# Patient Record
Sex: FEMALE | Race: WHITE | NOT HISPANIC OR LATINO | URBAN - METROPOLITAN AREA
[De-identification: names, ages, dates, MRNs, and addresses within clinical notes are randomized per-mention and may not be internally consistent; named-entity substitution may affect disease eponyms.]

---

## 2021-03-25 ENCOUNTER — HOSPITAL ENCOUNTER (OUTPATIENT)
Dept: GENERAL RADIOLOGY | Facility: HOSPITAL | Age: 26
Discharge: HOME | End: 2021-03-25
Admitting: INTERNAL MEDICINE

## 2021-03-25 ENCOUNTER — TRANSCRIBE ORDERS (OUTPATIENT)
Dept: ADMINISTRATIVE | Facility: HOSPITAL | Age: 26
End: 2021-03-25

## 2021-03-25 DIAGNOSIS — M54.41 ACUTE RIGHT-SIDED BACK PAIN WITH SCIATICA: Primary | ICD-10-CM

## 2021-03-25 PROCEDURE — 72114 X-RAY EXAM L-S SPINE BENDING: CPT

## 2022-02-11 ENCOUNTER — LAB (OUTPATIENT)
Dept: LAB | Facility: HOSPITAL | Age: 27
End: 2022-02-11

## 2022-02-11 ENCOUNTER — TRANSCRIBE ORDERS (OUTPATIENT)
Dept: LAB | Facility: HOSPITAL | Age: 27
End: 2022-02-11

## 2022-02-11 DIAGNOSIS — L57.8 NODULAR ELASTOSIS WITH CYSTS AND COMEDONES OF FAVRE AND RACOUCHOT: ICD-10-CM

## 2022-02-11 DIAGNOSIS — O92.6 PERSISTENT POSTPARTUM AMENORRHEA-GALACTORRHEA SYNDROME: ICD-10-CM

## 2022-02-11 DIAGNOSIS — L70.0 NODULAR ELASTOSIS WITH CYSTS AND COMEDONES OF FAVRE AND RACOUCHOT: Primary | ICD-10-CM

## 2022-02-11 DIAGNOSIS — N91.1 PERSISTENT POSTPARTUM AMENORRHEA-GALACTORRHEA SYNDROME: ICD-10-CM

## 2022-02-11 DIAGNOSIS — L57.8 NODULAR ELASTOSIS WITH CYSTS AND COMEDONES OF FAVRE AND RACOUCHOT: Primary | ICD-10-CM

## 2022-02-11 DIAGNOSIS — L70.0 NODULAR ELASTOSIS WITH CYSTS AND COMEDONES OF FAVRE AND RACOUCHOT: ICD-10-CM

## 2022-02-11 LAB
DEPRECATED RDW RBC AUTO: 41.6 FL (ref 37–54)
ERYTHROCYTE [DISTWIDTH] IN BLOOD BY AUTOMATED COUNT: 13.1 % (ref 12.3–15.4)
HCT VFR BLD AUTO: 42.7 % (ref 34–46.6)
HGB BLD-MCNC: 14.4 G/DL (ref 12–15.9)
MCH RBC QN AUTO: 29.7 PG (ref 26.6–33)
MCHC RBC AUTO-ENTMCNC: 33.7 G/DL (ref 31.5–35.7)
MCV RBC AUTO: 88 FL (ref 79–97)
PLATELET # BLD AUTO: 253 10*3/MM3 (ref 140–450)
PMV BLD AUTO: 9.8 FL (ref 6–12)
RBC # BLD AUTO: 4.85 10*6/MM3 (ref 3.77–5.28)
WBC NRBC COR # BLD: 6.63 10*3/MM3 (ref 3.4–10.8)

## 2022-02-11 PROCEDURE — 84146 ASSAY OF PROLACTIN: CPT

## 2022-02-11 PROCEDURE — 84443 ASSAY THYROID STIM HORMONE: CPT

## 2022-02-11 PROCEDURE — 83498 ASY HYDROXYPROGESTERONE 17-D: CPT

## 2022-02-11 PROCEDURE — 85027 COMPLETE CBC AUTOMATED: CPT | Performed by: OBSTETRICS & GYNECOLOGY

## 2022-02-11 PROCEDURE — 84403 ASSAY OF TOTAL TESTOSTERONE: CPT

## 2022-02-11 PROCEDURE — 36415 COLL VENOUS BLD VENIPUNCTURE: CPT

## 2022-02-11 PROCEDURE — 82627 DEHYDROEPIANDROSTERONE: CPT

## 2022-02-12 LAB
DHEA-S SERPL-MCNC: 321 UG/DL (ref 84.8–378)
PROLACTIN SERPL-MCNC: 9.24 NG/ML (ref 4.79–23.3)
TESTOST SERPL-MCNC: 95.6 NG/DL (ref 8.4–48.1)
TSH SERPL DL<=0.05 MIU/L-ACNC: 2.15 UIU/ML (ref 0.27–4.2)

## 2022-02-18 LAB — 17OHP SERPL-MCNC: 100 NG/DL

## 2024-07-29 ENCOUNTER — OFFICE VISIT (OUTPATIENT)
Dept: ENDOCRINOLOGY | Facility: CLINIC | Age: 29
End: 2024-07-29
Payer: COMMERCIAL

## 2024-07-29 VITALS
SYSTOLIC BLOOD PRESSURE: 116 MMHG | HEIGHT: 66 IN | BODY MASS INDEX: 30.62 KG/M2 | WEIGHT: 190.5 LBS | DIASTOLIC BLOOD PRESSURE: 70 MMHG | OXYGEN SATURATION: 98 % | HEART RATE: 84 BPM

## 2024-07-29 DIAGNOSIS — R94.6 ABNORMAL THYROID FUNCTION TEST: ICD-10-CM

## 2024-07-29 DIAGNOSIS — R79.89 ABNORMAL CORTISOL LEVEL: Primary | ICD-10-CM

## 2024-07-29 DIAGNOSIS — E28.2 PCOS (POLYCYSTIC OVARIAN SYNDROME): ICD-10-CM

## 2024-07-29 PROCEDURE — 84439 ASSAY OF FREE THYROXINE: CPT | Performed by: PHYSICIAN ASSISTANT

## 2024-07-29 PROCEDURE — 84443 ASSAY THYROID STIM HORMONE: CPT | Performed by: PHYSICIAN ASSISTANT

## 2024-07-29 PROCEDURE — 99204 OFFICE O/P NEW MOD 45 MIN: CPT | Performed by: PHYSICIAN ASSISTANT

## 2024-07-29 RX ORDER — NORETHINDRONE ACETATE AND ETHINYL ESTRADIOL 1MG-20(21)
1 KIT ORAL DAILY
COMMUNITY

## 2024-07-29 RX ORDER — EPINEPHRINE 0.3 MG/.3ML
0.3 INJECTION SUBCUTANEOUS ONCE
COMMUNITY

## 2024-07-29 RX ORDER — MAGNESIUM CARB/ALUMINUM HYDROX 105-160MG
50 TABLET,CHEWABLE ORAL ONCE
COMMUNITY

## 2024-07-29 RX ORDER — METFORMIN HYDROCHLORIDE 500 MG/1
500 TABLET, EXTENDED RELEASE ORAL
Qty: 90 TABLET | Refills: 1 | Status: SHIPPED | OUTPATIENT
Start: 2024-07-29

## 2024-07-29 RX ORDER — FLUTICASONE PROPIONATE 50 MCG
1 SPRAY, SUSPENSION (ML) NASAL DAILY
COMMUNITY

## 2024-07-29 RX ORDER — TRAZODONE HYDROCHLORIDE 50 MG/1
50 TABLET ORAL NIGHTLY
COMMUNITY

## 2024-07-29 RX ORDER — MULTIPLE VITAMINS W/ MINERALS TAB 9MG-400MCG
1 TAB ORAL DAILY
COMMUNITY

## 2024-07-29 RX ORDER — CHOLECALCIFEROL (VITAMIN D3) 25 MCG
5000 TABLET ORAL DAILY
COMMUNITY

## 2024-07-29 RX ORDER — MULTIVIT WITH MINERALS/LUTEIN
1000 TABLET ORAL DAILY
COMMUNITY

## 2024-07-29 NOTE — ASSESSMENT & PLAN NOTE
Discussed with patient importance of blood sugar control for management of PCOS.  Discussed lifestyle tips: Reducing refined sugars, eating within 60 minutes of waking, caffeine after meal rather than before, increased resistance activity, limited duration of aerobic activity, sleep 8 to 9 hours a night, stress management, avoid tobacco/alcohol.   Discussed medications that may be helpful include metformin, spironolactone, OCP.   Patient interested in trying metformin-start 500 mg ER once daily.  Caution GI upset/diarrhea with use.

## 2024-07-29 NOTE — ASSESSMENT & PLAN NOTE
Gradual increase in TSH over time.  Discussed possible contributor to symptoms versus possible birth control as discussed above.  Advise recheck TFT for monitoring.  Consider additional TPO antibodies if subclinical.

## 2024-07-29 NOTE — ASSESSMENT & PLAN NOTE
Likely abnormal blood cortisol due to birth control use; Result from 24-hour urine cortisol normal on result note on patient portal; pending fax for confirmation, normal ACTH and prolactin. Estrogen progesterone expected to be suppressed with hormonal birth control use. Hx normal 17-OH progesterone and DHEA-S. Hx normal 17-OH progesterone and DHEA-S.   Symptoms consistent with hypercortisolism versus side effect of current hormonal birth control.   Ddx: thyroid disease, adrenal disease, pituitary disease  Advised consider stopping birth control or switching to combined OCP with lower antiadrenergic activity (ethynodiol, norgestimate, desogestrel, drospirsnone, dienogest) - patient will discuss with GYN.   Discussed with patient further evaluation with repeat 24 hr urine cortisol or dexamethasone suppression as needed, will need to be off birth control for at least 6 weeks for this to be accurate.   Defers testing at this time, will consider after discussing plan with GYN.

## 2024-07-29 NOTE — PROGRESS NOTES
Chief Complaint   Patient presents with    elevated morning coritsol      HPI  Aby Muniz is a 29 y.o. female presents today for evaluation of abnormal cortisol. Referring Provider: Mercedes Leonard MD.     Past medical history: PCOS (dx: 2010 with polycystic ovaries, hx of ovarian cyst rupture)    Current regimen includes: Junel 1/20, Zoloft 25 mg (taking for 1 year; hx celexa prior), trazodone 50 mg, Nurtec 75 mg (not taking as no relief, albuterol as needed    Expresses concerns for weight gain (estimates about 50 pounds in the last 1 to 2 years), fatigue, difficulty sleeping, alternating constipation/diarrhea, facial flushing and rash at nighttime, joint pain, headache that have been chronic/intermittent for the past 1 to 2 years.  Headaches worse around time of menses.  Denies loss of peripheral vision.    She reports history of being off birth control about 2 years ago for a brief time -she reports feeling better while not taking, but restarted due to concerns for irregular menses and acne, was noted to have elevated testosterone as well.  She has been on birth control since age 14, was previously taking Junel prior to restarting.  She was briefly on spironolactone for acne about 2 years ago and stopped about 1 year ago -prescribed by dermatologist in past.    LMP: currently day 2; cycles monthly; On birth control since 14; Started menses age 13.   Birth control: Junel FE 1/20  GYN: Dr Collins; [facility]     Labs from  5/28/2024 24-hour urine cortisol done; normal reported on patient portal  5/23/2024 ACTH 22  5/23/2024 (done at 11:02 AM) cortisol: 27.6, FSH: 5.0, LH: 7.0, estradiol less than 15, progesterone less than 0.5, testosterone: 25, TSH: 4.36, Prolactin: 13.6   3/14/2024 TSH: 2.86, FT4: 0.8, B12: 677, vitamin D: 54.8, A1c: 5.7%    Grandmother with hx thyroid cancer. Denies family history of MENs or other endocrine cancers.     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 No pertinent past medical history.  Past Surgical History:   Procedure Laterality Date    LAPAROSCOPIC OVARIAN CYSTECTOMY      TONSILECTOMY, ADENOIDECTOMY, BILATERAL MYRINGOTOMY AND TUBES        Family History   Problem Relation Age of Onset    Hypertension Mother     Melanoma Father     No Known Problems Sister       Social History     Socioeconomic History    Marital status: Unknown   Tobacco Use    Smoking status: Never    Smokeless tobacco: Never   Vaping Use    Vaping status: Never Used   Substance and Sexual Activity    Alcohol use: Never    Drug use: Never    Sexual activity: Defer      Allergies   Allergen Reactions    Dust Mite Extract Unknown - Low Severity    Molds & Smuts Unknown - Low Severity      Current Outpatient Medications on File Prior to Visit   Medication Sig Dispense Refill    Cholecalciferol 25 MCG (1000 UT) tablet Take 5 tablets by mouth Daily.      EPINEPHrine (EpiPen 2-Maximino) 0.3 MG/0.3ML solution auto-injector injection Inject 0.3 mL into the appropriate muscle as directed by prescriber 1 (One) Time.      fluticasone (FLONASE) 50 MCG/ACT nasal spray 1 spray into the nostril(s) as directed by provider Daily.      Inositol-D Chiro-Inositol (OVASITOL PO) Take 1 Scoop by mouth 2 (Two) Times a Day.      magnesium citrate 1.745 GM/30ML solution solution Take 50 mL by mouth 1 (One) Time. 50      multivitamin with minerals tablet tablet Take 1 tablet by mouth Daily.      norethindrone-ethinyl estradiol FE (JUNEL FE 1/20) 1-20 MG-MCG per tablet Take 1 tablet by  "mouth Daily.      sertraline (ZOLOFT) 50 MG tablet Take 1 tablet by mouth Daily.      traZODone (DESYREL) 50 MG tablet Take 1 tablet by mouth Every Night.      VITAMIN E 1000 UNIT capsule Take 1 capsule by mouth Daily.       No current facility-administered medications on file prior to visit.        Review of Systems   Constitutional:  Positive for diaphoresis, fatigue and unexpected weight gain. Negative for activity change and appetite change.   HENT:  Negative for trouble swallowing and voice change.    Eyes:  Negative for visual disturbance.   Respiratory:  Negative for shortness of breath.    Cardiovascular:  Negative for chest pain and palpitations.   Gastrointestinal:  Positive for constipation and diarrhea. Negative for abdominal pain.   Endocrine: Positive for polydipsia and polyuria. Negative for cold intolerance and heat intolerance.   Musculoskeletal:  Positive for arthralgias, back pain, myalgias, neck pain and neck stiffness.   Skin:  Positive for rash.   Neurological:  Positive for headache. Negative for dizziness.   Hematological:  Bruises/bleeds easily.   Psychiatric/Behavioral:  Positive for decreased concentration and sleep disturbance. The patient is nervous/anxious.         /70 (BP Location: Right arm, Patient Position: Sitting, Cuff Size: Adult)   Pulse 84   Ht 167.6 cm (66\")   Wt 86.4 kg (190 lb 8 oz)   SpO2 98%   BMI 30.75 kg/m²      Physical Exam  Constitutional:       Appearance: Normal appearance.   Neck:      Thyroid: No thyroid mass, thyromegaly or thyroid tenderness.   Cardiovascular:      Rate and Rhythm: Normal rate.   Pulmonary:      Effort: Pulmonary effort is normal.   Musculoskeletal:         General: Normal range of motion.   Skin:     General: Skin is warm and dry.   Neurological:      General: No focal deficit present.      Mental Status: She is alert and oriented to person, place, and time.   Psychiatric:         Mood and Affect: Mood normal.         Behavior: " "Behavior normal.         LABS AND IMAGING  CMP:  Lab Results   Component Value Date    BUN 11 07/05/2023    CREATININE 0.83 07/05/2023    EGFR 98.6 07/05/2023    BCR 13.3 07/05/2023     07/05/2023    K 4.4 07/05/2023    CO2 26.0 07/05/2023    CALCIUM 9.6 07/05/2023    ALBUMIN 4.2 07/05/2023    BILITOT 0.2 07/05/2023    ALKPHOS 61 07/05/2023    AST 20 07/05/2023    ALT 15 07/05/2023     Lipid Panel:  No results found for: \"CHOL\", \"TRIG\", \"HDL\", \"VLDL\", \"LDL\"  HbA1c:     Glucose:  No results found for: \"POCGLU\"  Microalbumin:  No results found for: \"MALBCRERATIO\"  TSH:  Lab Results   Component Value Date    TSH 1.930 07/05/2023       Assessment and Plan    Diagnoses and all orders for this visit:    1. Abnormal cortisol level (Primary)  Assessment & Plan:  Likely abnormal blood cortisol due to birth control use; Result from 24-hour urine cortisol normal on result note on patient portal; pending fax for confirmation, normal ACTH and prolactin. Estrogen progesterone expected to be suppressed with hormonal birth control use. Hx normal 17-OH progesterone and DHEA-S. Hx normal 17-OH progesterone and DHEA-S.   Symptoms consistent with hypercortisolism versus side effect of current hormonal birth control.   Ddx: thyroid disease, adrenal disease, pituitary disease  Advised consider stopping birth control or switching to combined OCP with lower antiadrenergic activity (ethynodiol, norgestimate, desogestrel, drospirsnone, dienogest) - patient will discuss with GYN.   Discussed with patient further evaluation with repeat 24 hr urine cortisol or dexamethasone suppression as needed, will need to be off birth control for at least 6 weeks for this to be accurate.   Defers testing at this time, will consider after discussing plan with GYN.     Orders:  -     metFORMIN ER (GLUCOPHAGE-XR) 500 MG 24 hr tablet; Take 1 tablet by mouth Daily With Breakfast.  Dispense: 90 tablet; Refill: 1    2. PCOS (polycystic ovarian " syndrome)  Assessment & Plan:  Discussed with patient importance of blood sugar control for management of PCOS.  Discussed lifestyle tips: Reducing refined sugars, eating within 60 minutes of waking, caffeine after meal rather than before, increased resistance activity, limited duration of aerobic activity, sleep 8 to 9 hours a night, stress management, avoid tobacco/alcohol.   Discussed medications that may be helpful include metformin, spironolactone, OCP.   Patient interested in trying metformin-start 500 mg ER once daily.  Caution GI upset/diarrhea with use.        3. Abnormal thyroid function test  Assessment & Plan:  Gradual increase in TSH over time.  Discussed possible contributor to symptoms versus possible birth control as discussed above.  Advise recheck TFT for monitoring.  Consider additional TPO antibodies if subclinical.      Orders:  -     TSH  -     T4, Free         Return in about 4 months (around 11/29/2024) for follow-up abnormal cortisol, PCOS. The patient was instructed to contact the clinic with any interval questions or concerns.    Electronically signed by: Lali Owen PA-C   Endocrinology    Please note that portions of this note were completed with a voice recognition program.

## 2024-07-30 LAB
T4 FREE SERPL-MCNC: 1.13 NG/DL (ref 0.92–1.68)
TSH SERPL DL<=0.05 MIU/L-ACNC: 2.53 UIU/ML (ref 0.27–4.2)

## 2024-07-31 ENCOUNTER — PATIENT ROUNDING (BHMG ONLY) (OUTPATIENT)
Dept: ENDOCRINOLOGY | Facility: CLINIC | Age: 29
End: 2024-07-31
Payer: COMMERCIAL

## 2024-07-31 NOTE — PROGRESS NOTES
A [employer] message has been sent to the patient for patient rounding with [facility]

## 2024-12-09 ENCOUNTER — OFFICE VISIT (OUTPATIENT)
Dept: ENDOCRINOLOGY | Facility: CLINIC | Age: 29
End: 2024-12-09
Payer: COMMERCIAL

## 2024-12-09 VITALS
BODY MASS INDEX: 30.08 KG/M2 | HEART RATE: 72 BPM | DIASTOLIC BLOOD PRESSURE: 70 MMHG | HEIGHT: 66 IN | OXYGEN SATURATION: 97 % | RESPIRATION RATE: 18 BRPM | WEIGHT: 187.2 LBS | SYSTOLIC BLOOD PRESSURE: 112 MMHG

## 2024-12-09 DIAGNOSIS — R94.6 ABNORMAL THYROID FUNCTION TEST: ICD-10-CM

## 2024-12-09 DIAGNOSIS — R73.03 PREDIABETES: ICD-10-CM

## 2024-12-09 DIAGNOSIS — E66.811 CLASS 1 OBESITY WITH SERIOUS COMORBIDITY AND BODY MASS INDEX (BMI) OF 30.0 TO 30.9 IN ADULT, UNSPECIFIED OBESITY TYPE: ICD-10-CM

## 2024-12-09 DIAGNOSIS — E28.2 PCOS (POLYCYSTIC OVARIAN SYNDROME): Primary | ICD-10-CM

## 2024-12-09 DIAGNOSIS — R79.89 ABNORMAL CORTISOL LEVEL: ICD-10-CM

## 2024-12-09 PROCEDURE — 99214 OFFICE O/P EST MOD 30 MIN: CPT | Performed by: PHYSICIAN ASSISTANT

## 2024-12-09 RX ORDER — METFORMIN HYDROCHLORIDE 750 MG/1
750 TABLET, EXTENDED RELEASE ORAL
Qty: 90 TABLET | Refills: 2 | Status: SHIPPED | OUTPATIENT
Start: 2024-12-09

## 2024-12-09 NOTE — ASSESSMENT & PLAN NOTE
Patient's (Body mass index is 30.23 kg/m².) indicates that they are obese (BMI >30) with health conditions that include impaired fasting glucose . Weight is improving with treatment. BMI  is above average; BMI management plan is completed. We discussed portion control and increasing exercise.   Discussed may continue to have gradual improvement in weight with metformin.   Would benefit from GLP1a given hx prediabetes, PCOS, but unfortunately not currently on formulary with insurance; advised to contact office if changes between visits.

## 2024-12-09 NOTE — ASSESSMENT & PLAN NOTE
Symptoms with mild improvement with stopping OCP and starting metformin.  Discussed may have benefit with increase in dose metformin - interested in trying this; alternative combined OCP  Rx: increase metformin 750 mg ER once daily.   Caution GI upset/diarrhea with use   Encouraged continued effort toward lifestyle management.

## 2024-12-09 NOTE — ASSESSMENT & PLAN NOTE
Mild elevated A1C 5.7% 3/2024  Likely PCOS contributor  Repeat A1C with labs  Rx: increase metformin 750 mg ER

## 2024-12-09 NOTE — PROGRESS NOTES
Chief Complaint   Patient presents with    Polycystic Ovary Syndrome    Abnormal Lab     cortisol        HPI  Aby Muniz is a 29 y.o. female presents today for evaluation of abnormal cortisol, PCOS, abnormal thyroid lab. They were last seen for this 7/29/2024. Stopped OCP since last visit and started metformin 500 mg ER.     Tolerating metformin well, mild loose stool initially.   Reports more energy since stopping birth control/starting metformin, headaches improved. Weight gain has slowed. Menses continue to be regular. Continues with difficulty sleeping occasionally, has noticed the past 1-2 weeks. Continues with effort toward increase protein/reduce CHO and regular physical activity.     Has follow-up appointment scheduled with GYN next month.   LMP: 11/10/2024; cycles monthly; History of being on birth control since 14; Started menses age 13.   Birth control: condoms  GYN: Dr Collins; [facility]    -----  Visit 7/29/2024   reported concerns for weight gain (estimates about 50 pounds in the last 1 to 2 years), fatigue, difficulty sleeping, alternating constipation/diarrhea, facial flushing and rash at nighttime, joint pain, headache that have been chronic/intermittent for the past 1 to 2 years.  Headaches worse around time of menses.  Denies loss of peripheral vision.     She reports history of being off birth control about 2 years ago for a brief time -she reports feeling better while not taking, but restarted due to concerns for irregular menses and acne, was noted to have elevated testosterone as well.  She has been on birth control since age 14, was previously taking Junel prior to restarting.  She was briefly on spironolactone for acne about 2 years ago and stopped about 1 year ago -prescribed by dermatologist in past.     -----   Labs from  7/29/2024 TSH: 2.5, FT4: 1.13   5/28/2024 24-hour urine cortisol done; normal reported on patient portal  5/23/2024 ACTH 22  5/23/2024 (done at  11:02 AM) cortisol: 27.6, FSH: 5.0, LH: 7.0, estradiol less than 15, progesterone less than 0.5, testosterone: 25, TSH: 4.36, Prolactin: 13.6   3/14/2024 TSH: 2.86, FT4: 0.8, B12: 677, vitamin D: 54.8, A1c: 5.7%     Grandmother with hx thyroid cancer. Denies family history of MENs or other endocrine cancers.      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 No pertinent past medical history.  Past Surgical History:   Procedure Laterality Date    LAPAROSCOPIC OVARIAN CYSTECTOMY      TONSILECTOMY, ADENOIDECTOMY, BILATERAL MYRINGOTOMY AND TUBES        Family History   Problem Relation Age of Onset    Hypertension Mother     Melanoma Father     No Known Problems Sister       Social History     Socioeconomic History    Marital status: Unknown   Tobacco Use    Smoking status: Never    Smokeless tobacco: Never   Vaping Use    Vaping status: Never Used   Substance and Sexual Activity    Alcohol use: Never    Drug use: Never    Sexual activity: Defer      Allergies   Allergen Reactions    Dust Mite Extract Unknown - Low Severity    Molds & Smuts Unknown - Low Severity      Current Outpatient Medications on File Prior to Visit   Medication Sig Dispense Refill    Cholecalciferol 25 MCG (1000 UT) tablet Take 5 tablets by mouth Daily.      EPINEPHrine (EpiPen 2-Maximino) 0.3 MG/0.3ML solution auto-injector injection Inject 0.3 mL into the appropriate muscle as directed by prescriber 1 (One) Time.      fluticasone (FLONASE) 50 MCG/ACT nasal spray Administer 1 spray into the nostril(s) as  "directed by provider Daily.      Inositol-D Chiro-Inositol (OVASITOL PO) Take 1 Scoop by mouth 2 (Two) Times a Day.      magnesium citrate 1.745 GM/30ML solution solution Take 50 mL by mouth 1 (One) Time. 50      multivitamin with minerals tablet tablet Take 1 tablet by mouth Daily.      sertraline (ZOLOFT) 50 MG tablet Take 1 tablet by mouth Daily.      traZODone (DESYREL) 50 MG tablet Take 1 tablet by mouth Every Night.      VITAMIN E 1000 UNIT capsule Take 1 capsule by mouth Daily.      [DISCONTINUED] metFORMIN ER (GLUCOPHAGE-XR) 500 MG 24 hr tablet Take 1 tablet by mouth Daily With Breakfast. 90 tablet 1    norethindrone-ethinyl estradiol FE (JUNEL FE 1/20) 1-20 MG-MCG per tablet Take 1 tablet by mouth Daily. (Patient not taking: Reported on 12/9/2024)       No current facility-administered medications on file prior to visit.        The following portions of the patient's history were reviewed and updated as appropriate: allergies, current medications, past family history, past medical history, past social history, past surgical history and problem list.    Review of Systems   Constitutional:  Negative for activity change, appetite change, fatigue, unexpected weight gain and unexpected weight loss.   Gastrointestinal:  Negative for abdominal pain, constipation and diarrhea.   Genitourinary:  Negative for menstrual problem.   Neurological:  Negative for headache.   Psychiatric/Behavioral:  Positive for sleep disturbance.         /70 (BP Location: Right arm, Patient Position: Sitting, Cuff Size: Adult)   Pulse 72   Resp 18   Ht 167.6 cm (65.98\")   Wt 84.9 kg (187 lb 3.2 oz)   SpO2 97%   BMI 30.23 kg/m²      Physical Exam  Constitutional:       Appearance: Normal appearance. She is obese.   Cardiovascular:      Rate and Rhythm: Normal rate.   Pulmonary:      Effort: Pulmonary effort is normal.   Musculoskeletal:         General: Normal range of motion.   Skin:     General: Skin is warm and dry. "   Neurological:      General: No focal deficit present.      Mental Status: She is alert and oriented to person, place, and time.   Psychiatric:         Mood and Affect: Mood normal.         Behavior: Behavior normal.         Assessment and Plan    Diagnoses and all orders for this visit:    1. PCOS (polycystic ovarian syndrome) (Primary)  Assessment & Plan:  Symptoms with mild improvement with stopping OCP and starting metformin.  Discussed may have benefit with increase in dose metformin - interested in trying this; alternative combined OCP  Rx: increase metformin 750 mg ER once daily.   Caution GI upset/diarrhea with use   Encouraged continued effort toward lifestyle management.     Orders:  -     Cortisol, Urine, Free 24Hr - Urine, Clean Catch; Future  -     DHEA-Sulfate; Future  -     Testosterone; Future  -     Basic Metabolic Panel; Future  -     Hemoglobin A1c; Future    2. Abnormal cortisol level  Assessment & Plan:  Likely abnormal results on serum and salivary testing due to hormonal birth control at time.   Prior 24 hour urine cortisol normal.   Repeat 24-hour urine cortisol.  Consider additional dexamethasone suppression test as needed.         3. Abnormal thyroid function test  Assessment & Plan:  Normal on labs from 7/2024.  Follow-up as needed.      4. Prediabetes  Assessment & Plan:  Mild elevated A1C 5.7% 3/2024  Likely PCOS contributor  Repeat A1C with labs  Rx: increase metformin 750 mg ER       5. Class 1 obesity with serious comorbidity and body mass index (BMI) of 30.0 to 30.9 in adult, unspecified obesity type  Assessment & Plan:  Patient's (Body mass index is 30.23 kg/m².) indicates that they are obese (BMI >30) with health conditions that include impaired fasting glucose . Weight is improving with treatment. BMI  is above average; BMI management plan is completed. We discussed portion control and increasing exercise.   Discussed may continue to have gradual improvement in weight with  metformin.   Would benefit from GLP1a given hx prediabetes, PCOS, but unfortunately not currently on formulary with insurance; advised to contact office if changes between visits.       Other orders  -     metFORMIN ER (GLUCOPHAGE-XR) 750 MG 24 hr tablet; Take 1 tablet by mouth Daily With Breakfast.  Dispense: 90 tablet; Refill: 2         Return in about 6 months (around 6/9/2025) for follow-up PCOS. The patient was instructed to contact the clinic with any interval questions or concerns.    Electronically signed by: Lali Owen PA-C   Endocrinology    Please note that portions of this note were completed with a voice recognition program.

## 2024-12-09 NOTE — ASSESSMENT & PLAN NOTE
Likely abnormal results on serum and salivary testing due to hormonal birth control at time.   Prior 24 hour urine cortisol normal.   Repeat 24-hour urine cortisol.  Consider additional dexamethasone suppression test as needed.

## 2025-01-25 DIAGNOSIS — R79.89 ABNORMAL CORTISOL LEVEL: ICD-10-CM

## 2025-01-27 RX ORDER — METFORMIN HYDROCHLORIDE 500 MG/1
500 TABLET, EXTENDED RELEASE ORAL
Qty: 90 TABLET | Refills: 1 | OUTPATIENT
Start: 2025-01-27